# Patient Record
Sex: FEMALE | Race: WHITE | Employment: FULL TIME | URBAN - METROPOLITAN AREA
[De-identification: names, ages, dates, MRNs, and addresses within clinical notes are randomized per-mention and may not be internally consistent; named-entity substitution may affect disease eponyms.]

---

## 2019-11-01 ENCOUNTER — HOSPITAL ENCOUNTER (EMERGENCY)
Age: 29
Discharge: HOME | End: 2019-11-01
Attending: EMERGENCY MEDICINE
Payer: OTHER GOVERNMENT

## 2019-11-01 VITALS
TEMPERATURE: 98 F | HEIGHT: 62 IN | DIASTOLIC BLOOD PRESSURE: 75 MMHG | BODY MASS INDEX: 34.04 KG/M2 | HEART RATE: 81 BPM | OXYGEN SATURATION: 98 % | SYSTOLIC BLOOD PRESSURE: 118 MMHG | RESPIRATION RATE: 15 BRPM | WEIGHT: 185 LBS

## 2019-11-01 DIAGNOSIS — S05.01XA ABRASION OF RIGHT CORNEA, INITIAL ENCOUNTER: Primary | ICD-10-CM

## 2019-11-01 PROCEDURE — 99281 EMR DPT VST MAYX REQ PHY/QHP: CPT

## 2019-11-01 RX ORDER — CIPROFLOXACIN HYDROCHLORIDE 3.5 MG/ML
1 SOLUTION/ DROPS TOPICAL 2 TIMES DAILY
Qty: 2.5 ML | Refills: 0 | Status: SHIPPED | OUTPATIENT
Start: 2019-11-01 | End: 2019-11-11

## 2019-11-01 RX ORDER — DEXTROAMPHETAMINE SACCHARATE, AMPHETAMINE ASPARTATE, DEXTROAMPHETAMINE SULFATE AND AMPHETAMINE SULFATE 5; 5; 5; 5 MG/1; MG/1; MG/1; MG/1
20 TABLET ORAL
COMMUNITY

## 2019-11-01 RX ORDER — FLUOXETINE HYDROCHLORIDE 40 MG/1
CAPSULE ORAL DAILY
COMMUNITY

## 2019-11-01 RX ORDER — BUPROPION HYDROCHLORIDE 300 MG/1
300 TABLET ORAL
COMMUNITY

## 2019-11-01 NOTE — ED TRIAGE NOTES
1year old son accidentally scratched pt in right eye 4 hours ago. Eye light sensitive and tearing.  Visual acuity 20/20

## 2019-11-01 NOTE — ED NOTES
The provider has reviewed discharge instructions with the patient. The patient verbalized understanding. Patient armband removed and given to patient to take home. Patient was informed of the privacy risks if armband lost or stolen. Pt alert, oriented x4 and ambulatory out of ER in [facility] at this time. VSS. Pain is a 0/10.

## 2019-11-01 NOTE — DISCHARGE INSTRUCTIONS
Patient Education        Corneal Scratches: Care Instructions  Your Care Instructions    The cornea is the clear surface that covers the front of the eye. When a speck of dirt, a wood chip, an insect, or another object flies into your eye, it can cause a painful scratch on the cornea. Wearing contact lenses too long or rubbing your eyes can also scratch the cornea. Small scratches usually heal in a day or two. Deeper scratches may take longer. If you have had a foreign object removed from your eye or you have a corneal scratch, you will need to watch for infection and vision problems while your eye heals. Follow-up care is a key part of your treatment and safety. Be sure to make and go to all appointments, and call your doctor if you are having problems. It's also a good idea to know your test results and keep a list of the medicines you take. How can you care for yourself at home? · The doctor probably used a medicine during your exam to numb your eye. When it wears off in 30 to 60 minutes, your eye pain may come back. Take pain medicines exactly as directed. ? If the doctor gave you a prescription medicine for pain, take it as prescribed. ? If you are not taking a prescription pain medicine, ask your doctor if you can take an over-the-counter medicine. ? Do not take two or more pain medicines at the same time unless the doctor told you to. Many pain medicines have acetaminophen, which is Tylenol. Too much acetaminophen (Tylenol) can be harmful. · Do not rub your injured eye. Rubbing can make it worse. · Use the prescribed eyedrops or ointment as directed. Be sure the dropper or bottle tip is clean. To put in eyedrops or ointment:  ? Tilt your head back, and pull your lower eyelid down with one finger. ? Drop or squirt the medicine inside the lower lid. ? Close your eye for 30 to 60 seconds to let the drops or ointment move around.   ? Do not touch the ointment or dropper tip to your eyelashes or any other surface. · Do not use your contact lens in your hurt eye until your doctor says you can. Also, do not wear eye makeup until your eye has healed. · Do not drive if you have blurred vision. · Bright light may hurt. Sunglasses can help. · To prevent eye injuries in the future, wear safety glasses or goggles when you work with machines or tools, mow the lawn, or ride a bike or motorcycle. When should you call for help? Call your doctor now or seek immediate medical care if:    · You have signs of an eye infection, such as:  ? Pus or thick discharge coming from the eye.  ? Redness or swelling around the eye.  ? A fever.     · You have new or worse eye pain.     · You have vision changes.     · It feels like there is something in your eye.     · Light hurts your eye.    Watch closely for changes in your health, and be sure to contact your doctor if:    · You do not get better as expected. Where can you learn more? Go to http://manfred-archana.info/. Enter F419 in the search box to learn more about \"Corneal Scratches: Care Instructions. \"  Current as of: May 5, 2019  Content Version: 12.2  © 0992-3907 [employer]. Care instructions adapted under license by [employer] (which disclaims liability or warranty for this information). If you have questions about a medical condition or this instruction, always ask your healthcare professional. [place] [address] any warranty or liability for your use of this information.

## 2019-11-03 NOTE — ED PROVIDER NOTES
[facility]  Emergency Department     Amaya Baum is a 34 y.o. female seen on 11/2/2019 at 8:36 PM in the [facility] EMERGENCY DEPT in room PIT/PIT. Chief Complaint   Patient presents with    Eye Injury       HPI:   80-year-old, otherwise healthy female, presents emergency department with complaints of pain to her right eye secondary to her 1year-old son scratching her in the eye about 4 hours prior to arrival.  She complains of extreme discomfort, light sensitivity, and tearing. She denies any visual acuity changes. She does not wear contact lenses      Review of Systems:    See HPI for details  CONST:  Denies: fever, chills, weakness  ENT: Denies: sore throat, earache, nasal congestion  EYES: Denies: vision changes, double vision  CARDIO: Denies: chest pain, palpitations  RESP: Denies: cough, shortness of breath, dyspnea on exertion  GI: Denies: abdominal pain, nausea, vomiting, diarrhea, melena, hematochezia  : Denies: dysuria, hematuria, urinary frequency  MUSC: Denies: muscle aches, joint pain  SKIN: Denies: hives, rash  PSYCH: Denies: anxiety, depression  ENDO: Denies: polyuria, polydipsia  Heme/Lymph:      Denies: easy bruising, bleeding  NEURO: Denies: headache, confusion, change in behavior, extremity,weakness, paresthesias    Past Medical History: Primary Care Doctor: Yudi, Not On File   History reviewed. No pertinent past medical history. History reviewed. No pertinent surgical history. Social History     Socioeconomic History    Marital status:      Spouse name: Not on file    Number of children: Not on file    Years of education: Not on file    Highest education level: Not on file   Tobacco Use    Smoking status: Never Smoker    Smokeless tobacco: Never Used     No current facility-administered medications on file prior to encounter.       Current Outpatient Medications on File Prior to Encounter   Medication Sig Dispense Refill    dextroamphetamine-amphetamine (ADDERALL) 20 mg tablet Take 20 mg by mouth.  FLUoxetine (PROZAC) 40 mg capsule Take  by mouth daily.  buPROPion XL (WELLBUTRIN XL) 300 mg XL tablet Take 300 mg by mouth every morning. Allergies   Allergen Reactions    Sulfa (Sulfonamide Antibiotics) Swelling        Physical Exam:    Vitals:    11/01/19 1657   BP: 118/75   Pulse: 81   Resp: 15   Temp: 98 °F (36.7 °C)   SpO2: 98%     Vital signs were reviewed. Constitutional: well appearing, nontoxic  Head: Atraumatic, normo-cephalic  Eyes: PERRL, photophobic tearing on the right, EOMI  Musculoskeletal: no deformities, edema  Skin: dry, no rashes  Neurologic: alert, oriented, answers questions follows commands  Psychiatric: Speech pattern and content normal     _________________________________________________________  Medical Decision Making:    Differential Diagnosis for this patient includes: Corneal abrasion, iritis, conjunctivitis, much less likely globe rupture, hyphema          _________________________________________________________  ED Evaluation    Labs: No results found for this or any previous visit (from the past 24 hour(s)). Radiology studies performed:   No orders to display           Medications - No data to display  _________________________________________________________  Tony Richard    Date/Time: 11/1/2019 6:30 PM    Performed by: PA        Corneal abrasion was present on eyelid eversion. Right eye location: 9 o'clock  Cornea is clear. Patient tolerance: Patient tolerated the procedure well with no immediate complications  My total time at bedside, performing this procedure was 1-15 minutes.      ======================================================  ASSESSMENT: 26-year-old female who presented with eye pain after trauma, who was noted to have a corneal abrasion after the eye was anesthetized with tetracaine then stained with foreseen.     PLAN: Ciprofloxacin ophthalmic eyedrops, Tylenol/ibuprofen for pain, cool compresses, follow-up with ophthalmology if no improvement in the next couple of days, or to the emergency department if symptoms worsen.       _________________________________________________________    Condition: Stable   Disposition: Home   diagnosis: Corneal abrasion     TALIA Ruiz; 11/2/2019 @8:36 PM================================